# Patient Record
Sex: MALE | Race: ASIAN
[De-identification: names, ages, dates, MRNs, and addresses within clinical notes are randomized per-mention and may not be internally consistent; named-entity substitution may affect disease eponyms.]

---

## 2019-11-14 ENCOUNTER — HOSPITAL ENCOUNTER (OUTPATIENT)
Dept: HOSPITAL 76 - DI | Age: 49
Discharge: HOME | End: 2019-11-14
Attending: SURGERY
Payer: COMMERCIAL

## 2019-11-14 DIAGNOSIS — I25.10: Primary | ICD-10-CM

## 2019-11-14 PROCEDURE — 93005 ELECTROCARDIOGRAM TRACING: CPT

## 2019-11-18 ENCOUNTER — HOSPITAL ENCOUNTER (OUTPATIENT)
Dept: HOSPITAL 76 - SDS | Age: 49
Discharge: HOME | End: 2019-11-18
Attending: SURGERY
Payer: COMMERCIAL

## 2019-11-18 VITALS — SYSTOLIC BLOOD PRESSURE: 144 MMHG | DIASTOLIC BLOOD PRESSURE: 95 MMHG

## 2019-11-18 DIAGNOSIS — K64.2: Primary | ICD-10-CM

## 2019-11-18 DIAGNOSIS — I25.2: ICD-10-CM

## 2019-11-18 DIAGNOSIS — Z79.82: ICD-10-CM

## 2019-11-18 DIAGNOSIS — Z86.010: ICD-10-CM

## 2019-11-18 DIAGNOSIS — Z95.5: ICD-10-CM

## 2019-11-18 DIAGNOSIS — I25.10: ICD-10-CM

## 2019-11-18 PROCEDURE — 46260 REMOVE IN/EX HEM GROUPS 2+: CPT

## 2019-11-18 PROCEDURE — 06BY0ZC EXCISION OF HEMORRHOIDAL PLEXUS, OPEN APPROACH: ICD-10-PCS | Performed by: SURGERY

## 2019-11-18 NOTE — OPERATIVE REPORT
DATE OF SERVICE: 11/18/2019

Physician: Jorje Navarrete MD

 

PREOPERATIVE DIAGNOSIS:  Symptomatic hemorrhoids.

 

POSTOPERATIVE DIAGNOSES:  Prolapsed thrombosed internal/external hemorrhoids.

 

PROCEDURE PERFORMED:  Internal/external hemorrhoidectomy x2.

 

ANESTHESIA:  General endotracheal by Ashley El CRNA, plus local anesthesia.

 

SURGEON:  Jorje Navarrete MD

 

ESTIMATED BLOOD LOSS:  Less than 5 mL

 

COMPLICATIONS:  None.

 

FINDINGS:  Two large prolapsed thrombosed internal/external hemorrhoidal 
complexes were present located in the right anterior and posterior quadrants.  
The anterior quadrant of which was the largest.  No other significant anorectal 
pathology was identified.

 

INDICATIONS:  Patient is a 49-year-old gentleman with longstanding history of 
symptomatic hemorrhoids manifested by prolapse requiring increasing frequent 
manual reduction and increasing discomfort over the past several weeks.  
Evaluation revealed large prolapsing internal/external hemorrhoids, and he was 
advised to undergo a hemorrhoidectomy for definitive surgical treatment.

 

TECHNIQUE:  After informed consent, the patient was taken to the operating room,
where he was placed under general endotracheal anesthesia;he was placed in the 
prone jackknife position with buttocks taped apart.  His perianal region was 
prepared with iodoform solution and draped in the usual sterile fashion.  The 
anal canal was gently dilated to 2 fingers and a univalved anal speculum 
inserted.  Anoscopy was carried out with findings as noted above.  The 2 large 
prolapsed and partially thrombosed internal/external hemorrhoidal complexes were
excised with the LigaSure in an elliptical fashion, being careful to avoid 
injury to the underlying sphincter mechanism and preserving anoderm as much as 
possible.  The excised hemorrhoids were sent for pathologic evaluation.  The 
LigaSure also provided hemostasis.  After hemostasis was ensured, the resulting 
anodermal defects were left open, and the wound was dressed with viscous 
Xylocaine-soaked Gelfoam inserted in the anal canal, followed by dry sterile 
dressing.  Anesthesia was terminated and patient transferred to the recovery 
room in satisfactory condition.  Sponge and needle counts were correct x2.  No 
drains were used.

 

 

cc: Dr. Lupe MD

DD: 11/18/2019 09:34

TD: 11/18/2019 09:37

Job #: [id number]

SHANA

## 2019-12-05 ENCOUNTER — HOSPITAL ENCOUNTER (EMERGENCY)
Dept: HOSPITAL 76 - ED | Age: 49
Discharge: HOME | End: 2019-12-05
Payer: COMMERCIAL

## 2019-12-05 VITALS — SYSTOLIC BLOOD PRESSURE: 155 MMHG | DIASTOLIC BLOOD PRESSURE: 90 MMHG

## 2019-12-05 DIAGNOSIS — R03.0: ICD-10-CM

## 2019-12-05 DIAGNOSIS — Z79.82: ICD-10-CM

## 2019-12-05 DIAGNOSIS — I25.10: ICD-10-CM

## 2019-12-05 DIAGNOSIS — L02.211: Primary | ICD-10-CM

## 2019-12-05 DIAGNOSIS — Z95.5: ICD-10-CM

## 2019-12-05 DIAGNOSIS — L03.311: ICD-10-CM

## 2019-12-05 PROCEDURE — 87181 SC STD AGAR DILUTION PER AGT: CPT

## 2019-12-05 PROCEDURE — 10061 I&D ABSCESS COMP/MULTIPLE: CPT

## 2019-12-05 PROCEDURE — 99283 EMERGENCY DEPT VISIT LOW MDM: CPT

## 2019-12-05 PROCEDURE — 87205 SMEAR GRAM STAIN: CPT

## 2019-12-05 PROCEDURE — 87070 CULTURE OTHR SPECIMN AEROBIC: CPT

## 2019-12-05 NOTE — ED PHYSICIAN DOCUMENTATION
PD HPI WOUND RECHECK





- Stated complaint


Stated Complaint: BITE ON LT HIP/SKIN BOIL





- Chief complaint


Chief Complaint: Wound





- Histroy obtained from


History obtained from: Patient (For about 3 days now has had a painful swelling 

above the left hip.  There was no clear injury.  He is never had anything like 

this before.  No fevers or chills.)





Review of Systems


Constitutional: denies: Fever, Chills


Cardiac: denies: Chest pain / pressure, Palpitations


Respiratory: denies: Dyspnea, Cough





PD PAST MEDICAL HISTORY





- Past Medical History


Past Medical History: Yes


Cardiovascular: Coronary artery disease


Respiratory: None


Endocrine/Autoimmune: None


GI: None


: None


HEENT: None


Psych: None


Musculoskeletal: None


Derm: None





- Past Surgical History


Past Surgical History: Yes


Cardiovascular: Coronary stent





- Present Medications


Home Medications: 


                                Ambulatory Orders











 Medication  Instructions  Recorded  Confirmed


 


Aspirin [Adult Aspirin Regimen] 81 mg PO DAILY 10/15/19 11/18/19


 


Polyethylene Glycol 3350 [Miralax] 17 gm ORAL DAILY PRN #1 bottle 11/18/19 


 


oxyCODONE [Roxicodone] 5 mg PO Q6H PRN #20 tablet 11/18/19 


 


Hydrocodone/Acetaminophen 1 - 2 each PO Q6H PRN #10 tablet 12/05/19 





[Hydrocodon-Acetaminophen 5-325]   


 


Sulfamethoxazole/Trimethoprim 1 each PO BID 7 Days #14 tablet 12/05/19 





[Sulfamethoxazole-Tmp Ds Tablet]   














- Allergies


Allergies/Adverse Reactions: 


                                    Allergies











Allergy/AdvReac Type Severity Reaction Status Date / Time


 


No Known Drug Allergies Allergy   Verified 12/05/19 19:59














- Social History


Does the pt smoke?: No


Smoking Status: Never smoker


Does the pt drink ETOH?: No


Does the pt have substance abuse?: No





- Immunizations


Immunizations are current?: No


Immunizations: TDAP >10years/unknown





- POLST


Patient has POLST: No





PD ED PE NORMAL





- Vitals


Vital signs reviewed: Yes





- General


General: Alert and oriented X 3, No acute distress





- Abdomen


Abdomen: Normal bowel sounds, Soft, Non tender





- Derm


Derm: Other (There is a large deep pointed abscess on the left lateral abdominal

wall with moderate surrounding cellulitis.  )





- Neuro


Neuro: Alert and oriented X 3, Normal speech





Results





- Vitals


Vitals: 


                               Vital Signs - 24 hr











  12/05/19





  19:54


 


Temperature 36.8 C


 


Heart Rate 98


 


Respiratory 16





Rate 


 


Blood Pressure 152/98 H


 


O2 Saturation 97








                                     Oxygen











O2 Source                      Room air

















Procedures





- Abscess I&D (location)


  ** L abd wall


Preparation: Alcohol, Lidocaine 1%, Other (IM dilaudid /ativan for 

anxiolysis/pain)


Incision: Incised with scalpel, Purulent drainage, Loculations broken, Packed 

(with 1/2 inch packing), Culture obtained


Other: Pt tolerated well, Dressing applied, Antibiotic prescribed





Departure





- Departure


Disposition: 01 Home, Self Care


Clinical Impression: 


 Abdominal wall abscess





Condition: Good


Record reviewed to determine appropriate education?: Yes


Instructions:  ED Abscess IandD


Prescriptions: 


Hydrocodone/Acetaminophen [Hydrocodon-Acetaminophen 5-325] 1 - 2 each PO Q6H PRN

#10 tablet


 PRN Reason: pain


Sulfamethoxazole/Trimethoprim [Sulfamethoxazole-Tmp Ds Tablet] 1 each PO BID 7 

Days #14 tablet


Comments: 


We are performing a wound culture, the results should be done in 48-72 hours.  

If antibiotic change is necessary we will call you.  Return if worse in the 

meantime, especially if you develop increased pain, fevers, cannot keep down the

medication.  Otherwise Return here on Saturday for wound check and packing 

removal





Your blood pressure was elevated today on check into the emergency department.  

This does not mean that you have hypertension, it is a common phenomenon to come

to the emergency department and have elevated blood pressure.  I recommend that 

you see your primary care physician within the week to have it rechecked when 

you are feeling better.

## 2019-12-06 ENCOUNTER — HOSPITAL ENCOUNTER (OUTPATIENT)
Dept: HOSPITAL 76 - ED | Age: 49
Setting detail: OBSERVATION
LOS: 1 days | Discharge: HOME | End: 2019-12-07
Attending: INTERNAL MEDICINE | Admitting: INTERNAL MEDICINE
Payer: COMMERCIAL

## 2019-12-06 ENCOUNTER — HOSPITAL ENCOUNTER (OUTPATIENT)
Dept: HOSPITAL 76 - EMS | Age: 49
Discharge: ANOTHER HOSPITAL | End: 2019-12-06
Attending: SURGERY
Payer: COMMERCIAL
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
## 2019-12-06 DIAGNOSIS — Z87.19: ICD-10-CM

## 2019-12-06 DIAGNOSIS — R55: ICD-10-CM

## 2019-12-06 DIAGNOSIS — D64.9: ICD-10-CM

## 2019-12-06 DIAGNOSIS — R19.5: Primary | ICD-10-CM

## 2019-12-06 DIAGNOSIS — I25.10: ICD-10-CM

## 2019-12-06 DIAGNOSIS — K92.2: Primary | ICD-10-CM

## 2019-12-06 DIAGNOSIS — Z86.010: ICD-10-CM
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
## 2019-12-06 DIAGNOSIS — T63.301S: ICD-10-CM

## 2019-12-06 DIAGNOSIS — L02.211: ICD-10-CM
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
## 2019-12-06 DIAGNOSIS — Z95.5: ICD-10-CM

## 2019-12-06 DIAGNOSIS — S01.01XA: ICD-10-CM

## 2019-12-06 DIAGNOSIS — Z79.82: ICD-10-CM

## 2019-12-06 LAB
ALBUMIN DIAFP-MCNC: 3.4 G/DL (ref 3.2–5.5)
ALBUMIN/GLOB SERPL: 1.2 {RATIO} (ref 1–2.2)
ALP SERPL-CCNC: 56 IU/L (ref 42–121)
ALT SERPL W P-5'-P-CCNC: 32 IU/L (ref 10–60)
ANION GAP SERPL CALCULATED.4IONS-SCNC: 10 MMOL/L (ref 6–13)
AST SERPL W P-5'-P-CCNC: 26 IU/L (ref 10–42)
BASOPHILS NFR BLD AUTO: 0.1 10^3/UL (ref 0–0.1)
BASOPHILS NFR BLD AUTO: 0.4 %
BILIRUB BLD-MCNC: 0.4 MG/DL (ref 0.2–1)
BUN SERPL-MCNC: 17 MG/DL (ref 6–20)
CALCIUM UR-MCNC: 8.4 MG/DL (ref 8.5–10.3)
CHLORIDE SERPL-SCNC: 105 MMOL/L (ref 101–111)
CO2 SERPL-SCNC: 25 MMOL/L (ref 21–32)
CREAT SERPLBLD-SCNC: 1.1 MG/DL (ref 0.6–1.2)
EOSINOPHIL # BLD AUTO: 0.1 10^3/UL (ref 0–0.7)
EOSINOPHIL NFR BLD AUTO: 1 %
ERYTHROCYTE [DISTWIDTH] IN BLOOD BY AUTOMATED COUNT: 15 % (ref 12–15)
GFRSERPLBLD MDRD-ARVRAT: 71 ML/MIN/{1.73_M2} (ref 89–?)
GLOBULIN SER-MCNC: 2.8 G/DL (ref 2.1–4.2)
GLUCOSE SERPL-MCNC: 135 MG/DL (ref 70–100)
HGB UR QL STRIP: 9.3 G/DL (ref 14–18)
HGB UR QL STRIP: 9.5 G/DL (ref 14–18)
INR PPP: 1.2 (ref 0.8–1.2)
LIPASE SERPL-CCNC: 24 U/L (ref 22–51)
LYMPHOCYTES # SPEC AUTO: 2.2 10^3/UL (ref 1.5–3.5)
LYMPHOCYTES NFR BLD AUTO: 19.7 %
MCH RBC QN AUTO: 20.3 PG (ref 27–31)
MCHC RBC AUTO-ENTMCNC: 29.5 G/DL (ref 32–36)
MCV RBC AUTO: 68.8 FL (ref 80–94)
MONOCYTES # BLD AUTO: 0.7 10^3/UL (ref 0–1)
MONOCYTES NFR BLD AUTO: 6.4 %
NEUTROPHILS # BLD AUTO: 8.1 10^3/UL (ref 1.5–6.6)
NEUTROPHILS # SNV AUTO: 11.3 X10^3/UL (ref 4.8–10.8)
NEUTROPHILS NFR BLD AUTO: 71.8 %
PDW BLD AUTO: 10.7 FL (ref 7.4–11.4)
PLAT MORPH BLD: (no result)
PLATELET # BLD: 280 10^3/UL (ref 130–450)
PLATELET BLD QL SMEAR: (no result)
PROT SPEC-MCNC: 6.2 G/DL (ref 6.7–8.2)
PROTHROM ACT/NOR PPP: 13.2 SECS (ref 9.9–12.6)
RBC MAR: 4.68 10^6/UL (ref 4.7–6.1)
RBC MORPH BLD: (no result)
SODIUM SERPLBLD-SCNC: 140 MMOL/L (ref 135–145)

## 2019-12-06 PROCEDURE — 86900 BLOOD TYPING SEROLOGIC ABO: CPT

## 2019-12-06 PROCEDURE — 96361 HYDRATE IV INFUSION ADD-ON: CPT

## 2019-12-06 PROCEDURE — 80320 DRUG SCREEN QUANTALCOHOLS: CPT

## 2019-12-06 PROCEDURE — 85027 COMPLETE CBC AUTOMATED: CPT

## 2019-12-06 PROCEDURE — 83690 ASSAY OF LIPASE: CPT

## 2019-12-06 PROCEDURE — 85025 COMPLETE CBC W/AUTO DIFF WBC: CPT

## 2019-12-06 PROCEDURE — 36415 COLL VENOUS BLD VENIPUNCTURE: CPT

## 2019-12-06 PROCEDURE — 85018 HEMOGLOBIN: CPT

## 2019-12-06 PROCEDURE — 93005 ELECTROCARDIOGRAM TRACING: CPT

## 2019-12-06 PROCEDURE — 86901 BLOOD TYPING SEROLOGIC RH(D): CPT

## 2019-12-06 PROCEDURE — 99285 EMERGENCY DEPT VISIT HI MDM: CPT

## 2019-12-06 PROCEDURE — 80053 COMPREHEN METABOLIC PANEL: CPT

## 2019-12-06 PROCEDURE — 80048 BASIC METABOLIC PNL TOTAL CA: CPT

## 2019-12-06 PROCEDURE — 85610 PROTHROMBIN TIME: CPT

## 2019-12-06 PROCEDURE — 85014 HEMATOCRIT: CPT

## 2019-12-06 PROCEDURE — 96374 THER/PROPH/DIAG INJ IV PUSH: CPT

## 2019-12-06 PROCEDURE — 84484 ASSAY OF TROPONIN QUANT: CPT

## 2019-12-06 PROCEDURE — 86850 RBC ANTIBODY SCREEN: CPT

## 2019-12-06 NOTE — ED PHYSICIAN DOCUMENTATION
PD HPI GI BLEED





- Stated complaint


Stated Complaint: GI BLEED





- Chief complaint


Chief Complaint: Neuro





- History obtained from


History obtained from: Patient





- History of Present Illness


Timing - onset: Enter  time (17:00), Today


Timing - details: Abrupt onset, Intermittant


Pain level max: 0


Pain level now: 0


Associated symptoms: BRBPR


Contributing factors: Aspirin use.  No: Alcohol use, Anticoagulated


Improved by: Other (nothing)


Worsened by: Other (no apparent exacerbating factors)


Similar symptoms before: Has not had sx before


Recently seen: Emergency Dept (T+R yesterday from this ED for left hip abscess 

(I+D, rx antibiotics))





- Additional information


Additional information: 





4 episodes of red/dark red blood per rectum since 5 PM today. After the fourth 

episode, he then had syncope with prodrome of lightheadedness, generalized 

weakness, pale. He fell from standing position, struck his head on floor and 

sustained forehead abrasion. He had 2 more episode of syncope when he tried to 

stand back up after recovering. Denies any pain, including HA and including 

abdominal pain.





Review of Systems


Constitutional: denies: Fever, Chills, Fatigue, Sweats


Eyes: reports: Reviewed and negative


Ears: reports: Reviewed and negative


Nose: reports: Reviewed and negative


Throat: reports: Reviewed and negative


Cardiac: reports: Reviewed and negative


Respiratory: reports: Reviewed and negative


GI: reports: Diarrhea, Bloody / black stool.  denies: Abdominal Pain, Abdominal 

Swelling, Nausea, Vomiting, Constipation, Hematemesis


: denies: Dysuria, Frequency


Skin: reports: Reviewed and negative


Musculoskeletal: reports: Reviewed and negative


Neurologic: reports: Generalized weakness (immediately preceding syncopal 

episodes), Syncope, Head injury, LOC.  denies: Focal weakness, Numbness, 

Headache





PD PAST MEDICAL HISTORY





- Past Medical History


Cardiovascular: Coronary artery disease


Respiratory: None


Endocrine/Autoimmune: None


GI: None


: None


HEENT: None


Psych: None


Musculoskeletal: None


Derm: None





- Past Surgical History


Past Surgical History: Yes


Cardiovascular: Coronary stent





- Present Medications


Home Medications: 


                                Ambulatory Orders











 Medication  Instructions  Recorded  Confirmed


 


Aspirin [Adult Aspirin Regimen] 81 mg PO DAILY 10/15/19 11/18/19


 


Polyethylene Glycol 3350 [Miralax] 17 gm ORAL DAILY PRN #1 bottle 11/18/19 


 


oxyCODONE [Roxicodone] 5 mg PO Q6H PRN #20 tablet 11/18/19 


 


Hydrocodone/Acetaminophen 1 - 2 each PO Q6H PRN #10 tablet 12/05/19 





[Hydrocodon-Acetaminophen 5-325]   


 


Sulfamethoxazole/Trimethoprim 1 each PO BID 7 Days #14 tablet 12/05/19 





[Sulfamethoxazole-Tmp Ds Tablet]   














- Allergies


Allergies/Adverse Reactions: 


                                    Allergies











Allergy/AdvReac Type Severity Reaction Status Date / Time


 


No Known Drug Allergies Allergy   Verified 12/06/19 20:15














- Social History


Does the pt smoke?: No


Smoking Status: Never smoker


Does the pt drink ETOH?: No


Does the pt have substance abuse?: No





- Immunizations


Immunizations are current?: No


Immunizations: TDAP >10years/unknown





- POLST


Patient has POLST: No





PD ED PE NORMAL





- Vitals


Vital signs reviewed: Yes





- General


General: Alert and oriented X 3, No acute distress, Well developed/nourished





- HEENT


HEENT: PERRL, EOMI, Moist mucous membranes, Other (superficial abrasion left 

forehead without tenderness or bony step off)





- Neck


Neck: No bony TTP





- Cardiac


Cardiac: RRR, No murmur, No gallop, No rub





- Respiratory


Respiratory: No respiratory distress, Clear bilaterally





- Abdomen


Abdomen: Normal bowel sounds, Soft, Non tender, Non distended





- Back


Back: No spinal TTP





- Derm


Derm: Normal color, Warm and dry





- Extremities


Extremities: No edema





- Neuro


Neuro: Alert and oriented X 3, CNs 2-12 intact, No motor deficit, No sensory d

eficit, Normal speech


Eye Opening: Spontaneous


Motor: Obeys Commands


Verbal: Oriented


GCS Score: 15





- Psych


Psych: Normal mood, Normal affect





- Free text exam


Free text exam: 





left hip I+D location has packing in place, small amount of dry blood on 

bandage.





Results





- Vitals


Vitals: 


                               Vital Signs - 24 hr











  12/06/19 12/06/19 12/06/19





  19:51 20:14 20:59


 


Temperature 36.5 C  


 


Heart Rate 68 76 74


 


Respiratory 22 18 13





Rate   


 


Blood Pressure 115/67 117/75 109/71


 


O2 Saturation 100 100 98














  12/06/19 12/06/19 12/06/19





  21:10 21:50 22:25


 


Temperature   


 


Heart Rate 75 67 64


 


Respiratory 19 19 25 H





Rate   


 


Blood Pressure 102/66 100/67 102/57 L


 


O2 Saturation 95 97 97














  12/06/19





  23:02


 


Temperature 


 


Heart Rate 68


 


Respiratory 22





Rate 


 


Blood Pressure 97/81 H


 


O2 Saturation 97








                                     Oxygen











O2 Source                      Room air

















- EKG (time done)


  ** No standard instances


Rate: Rate (enter#) (72)


Rhythm: NSR


Axis: LAD


Intervals: Normal DC


QRS: Normal


Ischemia: T wave inversion (III, with flat T aVF)





- Labs


Labs: 


                                Laboratory Tests











  12/06/19 12/06/19 12/06/19





  20:04 20:04 20:04


 


WBC   11.3 H 


 


RBC   4.68 L 


 


Hgb   9.5 L 


 


Hct   32.2 L 


 


MCV   68.8 L 


 


MCH   20.3 L 


 


MCHC   29.5 L 


 


RDW   15.0 


 


Plt Count   280 


 


MPV   10.7 


 


Neut # (Auto)   8.1 H 


 


Lymph # (Auto)   2.2 


 


Mono # (Auto)   0.7 


 


Eos # (Auto)   0.1 


 


Baso # (Auto)   0.1 


 


Absolute Nucleated RBC   0.00 


 


Nucleated RBC %   0.0 


 


Manual Slide Review   Indicated 


 


Platelet Estimate   NORMAL (130-450,000) 


 


Platelet Morphology   NORMAL APPEARANCE 


 


RBC Morph Micro Appear   1+ HYPOCHROMASIA 


 


PT    13.2 H


 


INR    1.2


 


Sodium   


 


Potassium   


 


Chloride   


 


Carbon Dioxide   


 


Anion Gap   


 


BUN   


 


Creatinine   


 


Estimated GFR (MDRD)   


 


Glucose   


 


Calcium   


 


Total Bilirubin   


 


AST   


 


ALT   


 


Alkaline Phosphatase   


 


Troponin I High Sens   


 


Total Protein   


 


Albumin   


 


Globulin   


 


Albumin/Globulin Ratio   


 


Lipase   


 


Ethyl Alcohol   


 


Blood Type  B POSITIVE  


 


Blood Type Recheck   


 


Antibody Screen  NEGATIVE  














  12/06/19 12/06/19 12/06/19





  20:04 20:04 22:40


 


WBC   


 


RBC   


 


Hgb   


 


Hct   


 


MCV   


 


MCH   


 


MCHC   


 


RDW   


 


Plt Count   


 


MPV   


 


Neut # (Auto)   


 


Lymph # (Auto)   


 


Mono # (Auto)   


 


Eos # (Auto)   


 


Baso # (Auto)   


 


Absolute Nucleated RBC   


 


Nucleated RBC %   


 


Manual Slide Review   


 


Platelet Estimate   


 


Platelet Morphology   


 


RBC Morph Micro Appear   


 


PT   


 


INR   


 


Sodium  140  


 


Potassium  3.4 L  


 


Chloride  105  


 


Carbon Dioxide  25  


 


Anion Gap  10.0  


 


BUN  17  


 


Creatinine  1.1  


 


Estimated GFR (MDRD)  71 L  


 


Glucose  135 H  


 


Calcium  8.4 L  


 


Total Bilirubin  0.4  


 


AST  26  


 


ALT  32  


 


Alkaline Phosphatase  56  


 


Troponin I High Sens   3.5 


 


Total Protein  6.2 L  


 


Albumin  3.4  


 


Globulin  2.8  


 


Albumin/Globulin Ratio  1.2  


 


Lipase  24  


 


Ethyl Alcohol  < 5.0  


 


Blood Type   


 


Blood Type Recheck    B POSITIVE


 


Antibody Screen   














  12/06/19





  22:40


 


WBC 


 


RBC 


 


Hgb  9.3 L


 


Hct  31.2 L


 


MCV 


 


MCH 


 


MCHC 


 


RDW 


 


Plt Count 


 


MPV 


 


Neut # (Auto) 


 


Lymph # (Auto) 


 


Mono # (Auto) 


 


Eos # (Auto) 


 


Baso # (Auto) 


 


Absolute Nucleated RBC 


 


Nucleated RBC % 


 


Manual Slide Review 


 


Platelet Estimate 


 


Platelet Morphology 


 


RBC Morph Micro Appear 


 


PT 


 


INR 


 


Sodium 


 


Potassium 


 


Chloride 


 


Carbon Dioxide 


 


Anion Gap 


 


BUN 


 


Creatinine 


 


Estimated GFR (MDRD) 


 


Glucose 


 


Calcium 


 


Total Bilirubin 


 


AST 


 


ALT 


 


Alkaline Phosphatase 


 


Troponin I High Sens 


 


Total Protein 


 


Albumin 


 


Globulin 


 


Albumin/Globulin Ratio 


 


Lipase 


 


Ethyl Alcohol 


 


Blood Type 


 


Blood Type Recheck 


 


Antibody Screen 














PD MEDICAL DECISION MAKING





- ED course


Complexity details: reviewed old records, reviewed results, re-evaluated 

patient, considered differential, d/w patient


ED course: 





After initial tests resulted but while awaiting 2nd h/h, I was in room talking 

with patient and he had abdominal cramping with urge to defecate; he then had 

bradycardia to mid/upper 30s lasting approximately 20-30 seconds (while still 

lying on stretcher), and then resumed NSR 60s bpm without any intervention. 





Departure





- Departure


Disposition: ED Place in Observation


Clinical Impression: 


 Syncope, Acute lower GI bleeding





Condition: Stable


Discharge Date/Time: 12/07/19 00:44

## 2019-12-07 VITALS — SYSTOLIC BLOOD PRESSURE: 132 MMHG | DIASTOLIC BLOOD PRESSURE: 80 MMHG

## 2019-12-07 LAB
ANION GAP SERPL CALCULATED.4IONS-SCNC: 6 MMOL/L (ref 6–13)
BUN SERPL-MCNC: 14 MG/DL (ref 6–20)
CALCIUM UR-MCNC: 8.6 MG/DL (ref 8.5–10.3)
CHLORIDE SERPL-SCNC: 111 MMOL/L (ref 101–111)
CO2 SERPL-SCNC: 26 MMOL/L (ref 21–32)
CREAT SERPLBLD-SCNC: 0.8 MG/DL (ref 0.6–1.2)
ERYTHROCYTE [DISTWIDTH] IN BLOOD BY AUTOMATED COUNT: 14.8 % (ref 12–15)
ERYTHROCYTE [DISTWIDTH] IN BLOOD BY AUTOMATED COUNT: 14.8 % (ref 12–15)
ERYTHROCYTE [DISTWIDTH] IN BLOOD BY AUTOMATED COUNT: 14.9 % (ref 12–15)
GFRSERPLBLD MDRD-ARVRAT: 102 ML/MIN/{1.73_M2} (ref 89–?)
GLUCOSE SERPL-MCNC: 106 MG/DL (ref 70–100)
HGB UR QL STRIP: 8.2 G/DL (ref 14–18)
HGB UR QL STRIP: 8.8 G/DL (ref 14–18)
HGB UR QL STRIP: 9 G/DL (ref 14–18)
MCH RBC QN AUTO: 20.1 PG (ref 27–31)
MCH RBC QN AUTO: 20.2 PG (ref 27–31)
MCH RBC QN AUTO: 20.9 PG (ref 27–31)
MCHC RBC AUTO-ENTMCNC: 29.4 G/DL (ref 32–36)
MCHC RBC AUTO-ENTMCNC: 29.5 G/DL (ref 32–36)
MCHC RBC AUTO-ENTMCNC: 29.9 G/DL (ref 32–36)
MCV RBC AUTO: 68.4 FL (ref 80–94)
MCV RBC AUTO: 68.6 FL (ref 80–94)
MCV RBC AUTO: 69.7 FL (ref 80–94)
NEUTROPHILS # SNV AUTO: 8.3 X10^3/UL (ref 4.8–10.8)
NEUTROPHILS # SNV AUTO: 8.9 X10^3/UL (ref 4.8–10.8)
NEUTROPHILS # SNV AUTO: 9.6 X10^3/UL (ref 4.8–10.8)
PDW BLD AUTO: 10.3 FL (ref 7.4–11.4)
PDW BLD AUTO: 10.9 FL (ref 7.4–11.4)
PDW BLD AUTO: 9.9 FL (ref 7.4–11.4)
PLATELET # BLD: 251 10^3/UL (ref 130–450)
PLATELET # BLD: 277 10^3/UL (ref 130–450)
PLATELET # BLD: 288 10^3/UL (ref 130–450)
RBC MAR: 4.07 10^6/UL (ref 4.7–6.1)
RBC MAR: 4.22 10^6/UL (ref 4.7–6.1)
RBC MAR: 4.46 10^6/UL (ref 4.7–6.1)
SODIUM SERPLBLD-SCNC: 143 MMOL/L (ref 135–145)

## 2019-12-07 RX ADMIN — HYDROCODONE BITARTRATE AND ACETAMINOPHEN PRN TAB: 7.5; 325 TABLET ORAL at 08:00

## 2019-12-07 RX ADMIN — SODIUM CHLORIDE SCH MLS/HR: 9 INJECTION, SOLUTION INTRAVENOUS at 01:31

## 2019-12-07 RX ADMIN — SODIUM CHLORIDE, PRESERVATIVE FREE SCH ML: 5 INJECTION INTRAVENOUS at 01:31

## 2019-12-07 RX ADMIN — SULFAMETHOXAZOLE AND TRIMETHOPRIM SCH TAB: 800; 160 TABLET ORAL at 09:07

## 2019-12-07 RX ADMIN — SODIUM CHLORIDE, PRESERVATIVE FREE SCH: 5 INJECTION INTRAVENOUS at 16:51

## 2019-12-07 RX ADMIN — HYDROCODONE BITARTRATE AND ACETAMINOPHEN PRN TAB: 7.5; 325 TABLET ORAL at 13:41

## 2019-12-07 RX ADMIN — SODIUM CHLORIDE SCH MLS/HR: 9 INJECTION, SOLUTION INTRAVENOUS at 08:53

## 2019-12-07 RX ADMIN — SODIUM CHLORIDE, PRESERVATIVE FREE SCH: 5 INJECTION INTRAVENOUS at 09:07

## 2019-12-07 RX ADMIN — SODIUM CHLORIDE SCH MLS/HR: 9 INJECTION, SOLUTION INTRAVENOUS at 16:49

## 2019-12-07 RX ADMIN — SULFAMETHOXAZOLE AND TRIMETHOPRIM SCH TAB: 800; 160 TABLET ORAL at 23:03

## 2019-12-07 NOTE — DISCHARGE SUMMARY
Discharge Summary


Admit Date: 19


Discharge Date: 19


Discharging Provider: Shaggy Contreras


Primary Care Provider: Rashad Andrade


Code Status: Attempt Resuscitation


Condition at Discharge: Stable


Discharge Disposition: 01 Home, Self Care


Discharge Facility Name: [facility]





- DIAGNOSES


Admission Diagnoses: 


1. Acute Lower GI Bleeding


2. Syncope


3. History of coronary artery disease


4. Abdominal wall abscess


5. Scalp laceration





Discharge Diagnoses with Status of Each Condition: 


1. Acute Lower GI Bleeding: Resolved


2. Syncope: Resolved


3. History of coronary artery disease: Chronic


4. Abdominal wall abscess: Ongoing. Being treated with bactrim DS


5. Scalp laceration: Stable








- HPI


History of Present Illness: 


Patient is a 50 y/o male who presented to the ED with complain of 4 episodes of 

bright red blood per rectum which happened around 5pm on 19. He had a 

syncope episode after the fourth episode. He fell from a standing position and 

hit his head on the toilet sink. There is a laceration on his forehead as a 

result. He was pale and weak at the time. It is reported that he had 2 more 

episodes of syncope when he tried to stand after recovery from the first 

syncope. He denied any pain. However he does experience intermittent abdominal 

cramping with bowel movement. He had a bowel movement in the ED which was 

described as brownish and loose. However, his wife reports there was a small 

amount of blood in it. CBC done showed a hemoglobin of 9.5 with a repeat of 9.3.

He is being admitted for closer monitoring and to trend his H&H


At bedside he is very AOX3 and does not appear to be in any significant 

discomfort. He denies chest pain, dyspnea, abd pain, n/v/d, fever or chills. 


He has history of internal/external hemorrhoid and underwent surgery in 2019. He also had a colonoscopy in 2019 3 polyps seen.


He was seen in the ED yesterday and had an abscess on his left oblique area I&D.

It was packed and he was placed on a 7 day course of bactrim with plan to follow

up with his PCP in 1-2 days.








- HOSPITAL COURSE


Hospital Course: 


Patient's hospital stay was fairly unremarkable. He was put on IV hydration and 

a clear liquid . Serial H&H was done q8hr.


Hemoglobin was 9.5, 9.3, 9.0, 8.2 then 8.8. He was able to ambulate around the 

floor with no difficulty.  His wound packing and dressing was changed and his 

bactrim DS was continued. He has a prescription of bactrim DS at home and is 

expected to take it bid until completion.


He has a scheduled appointment with Dr Rashad Andrade in 4 days. If he should 

have any new bleeding or increased fever, pain or redness at the site of his 

abdominal wall incision and drainage before he sees his PCP, he is to return to 

the ED.








- ALLERGIES


Allergies/Adverse Reactions: 


                                    Allergies











Allergy/AdvReac Type Severity Reaction Status Date / Time


 


No Known Drug Allergies Allergy   Verified 19 20:15














- MEDICATIONS


Home Medications: 


                                Ambulatory Orders











 Medication  Instructions  Recorded  Confirmed


 


Aspirin [Adult Aspirin Regimen] 81 mg PO DAILY 10/15/19 12/07/19


 


Sulfamethox/Trimeth 800/160 1 each PO BID #14 tablet 19 





[Bactrim Ds 800/160]   














- PHYSICAL EXAM AT DISCHARGE


General Appearance: positive: No acute distress


Eyes Bilateral: positive: Normal inspection, PERRL, EOMI


ENT: positive: ENT inspection nml, No signs of dehydration


Neck: positive: Nml inspection, No JVD, Trachea midline


Respiratory: positive: Chest non-tender, No respiratory distress, Breath sounds 

nml.  negative: Wheezes, Rales, Rhonchi


Cardiovascular: positive: Regular rate & rhythm


Abdomen: positive: Non-tender, Nml bowel sounds, No distention


Back: positive: Nml inspection


Skin: positive: Color nml, No rash, Warm


Extremities: positive: Non-tender, Full ROM


Neurologic/Psychiatric: positive: Oriented x3, CN's nml (2-12), Motor nml, 

Sensation nml





- LABS


Result Diagrams: 


                                 19 19:40





                                 19 04:55





- FOLLOW UP


Follow Up: 


With Dr Andrade in 4 days








- TIME SPENT


Time Spent in Discharge (Minutes): 31

## 2019-12-07 NOTE — DISCHARGE PLAN
Discharge Plan


Problem Reviewed?: Yes


Disposition: 01 Home, Self Care


Condition: Stable


Diet: Regular


Activity Restrictions: No Restrictions


Weight Bearing: Full Weight


Health Concerns: 


1. Lower GI Bleed


2. Syncope


3. Hx of Coronary Artery Disease


4. Abdominal wall abscess


Plan of Treatment: 


1. Lower GI Bleed


Resolved.


Hemoglobin stabilized. If it recurs, please return to the ED





2. Syncope.


Resolved.


Likely related to bleeding and/or vasovagal episode





3. Hx of Coronary Artery Disease


Continue taking baby aspirin





4. Abdominal Wall Abscess.


Packing and dressing changed. Continue taking prescription of bactrim you have 

at home until completed


Follow up with Dr Andrade as scheduled in 4 days


If pain or redness worsens before your appointment day, come back to the ED








Care Goals: 


1. Lower GI Bleed


Resolved.


Hemoglobin stabilized. If it recurs, please return to the ED





2. Syncope.


Resolved.


Likely related to bleeding and/or vasovagal episode





3. Hx of Coronary Artery Disease


Continue taking baby aspirin





4. Abdominal Wall Abscess.


Packing and dressing changed. Continue taking prescription of bactrim you have 

at home until completed


Follow up with Dr Andrade as scheduled in 4 days


If pain or redness worsens before your appointment day, come back to the ED


Assessment: 


Patient expressed understanding


No Smoking: If you smoke, Please STOP!  Call [phone] for help.


Follow-up with: 


Rashad Andrade MD [Primary Care Provider] -

## 2019-12-07 NOTE — CONSULTATION NOTE
Referring Provider


Name of Referring Provider:: Dr SHAH





Chief Complaint





- Chief Complaint


Chief Complaint: Spider bite w/cellulitis and abscess previously drained in ED a

couple of d





History of Present Illness





- History of Present Illness


HPI Comment/Other: 





Patient is a 48 y/o male who presented to the ED with complain of 4 episodes of 

bright red blood per rectum which happened around 5pm on 12/6/19. He had a syn

cope episode after the fourth episode. He fell from a standing position and hit 

his head on the toilet sink. There is a laceration on his forehead as a result. 

He was pale and weak at the time. It is reported that he had 2 more episodes of 

syncope when he tried to stand after recovery from the first syncope. He denied 

any pain. However he does experience intermittent abdominal cramping with bowel 

movement. He had a bowel movement in the ED which was described as brownish and 

loose. However, his wife reports there was a small amount of blood in it. CBC 

done showed a hemoglobin of 9.5 with a repeat of 9.3. He is being admitted for 

closer monitoring and to trend his H&H


At bedside he is very AOX3 and does not appear to be in any significant 

discomfort. He denies chest pain, dyspnea, abd pain, n/v/d, fever or chills. 


He has history of internal/external hemorrhoid and underwent surgery in November 2019. He also had a colonoscopy in October of 2019 3 polyps seen.


He was seen in the ED yesterday and had an abscess on his left oblique area I&D.

It was packed and he was placed on a 7 day course of bactrim with plan to follow

up with his PCP in 1-2 days.





History





- Past Medical History


Cardiovascular: reports: Coronary artery disease


Respiratory: reports: None


Endocrine/Autoimmune: reports: None


GI: reports: None


: reports: None


HEENT: reports: None


Psych: reports: None


Musculoskeletal: reports: None


Derm: reports: None


MRSA Hx?: No





- Past Surgical History


Cardiovascular: reports: Coronary stent


Other past surgical history: colonoscopy in October 2019





- Family & Social History


Family History Comment/Other: He denies any significant family history


Living arrangement: At home


Living Situation: With spouse/s.o.


Social History Notes: He denies alcohol, tobacco or illicit drug use





- POLST


Patient has POLST: No


POLST Status: Full Code





Meds/Allgy





- Home Medications


Home Medications: 


                                Ambulatory Orders











 Medication  Instructions  Recorded  Confirmed


 


Aspirin [Adult Aspirin Regimen] 81 mg PO DAILY 10/15/19 12/07/19


 


Sulfamethoxazole/Trimethoprim 1 each PO [id number] 12/07/19 12/07/19





[Sulfamethoxazole-Tmp Ds Tablet]   














- Allergies


Allergies/Adverse Reactions: 


                                    Allergies











Allergy/AdvReac Type Severity Reaction Status Date / Time


 


No Known Drug Allergies Allergy   Verified 12/06/19 20:15














Exam





- Vital Signs


Reviewed Vital Signs: Yes


Vital Signs: 





                                Vital Signs x48h











  Temp Pulse Resp BP Pulse Ox


 


 12/07/19 07:35  36.8 C  75  16  120/64  98


 


 12/07/19 05:01  36.7 C  71  16  101/54 L  98














- Physical Exam


General Appearance: positive: No acute distress


Eyes Bilateral: positive: Normal inspection


Skin: positive: Other (Abscess L eft lat thorax post-op I&D in ED showing some 

drainage and induration.)





Conclusion and Plan





- Lab Results





Laboratory Results





12/07/19 04:55: Sodium 143, Potassium 4.0, Chloride 111, Carbon Dioxide 26, 

Anion Gap 6.0, BUN 14, Creatinine 0.8, Estimated GFR (MDRD) 102, Glucose 106 H, 

Calcium 8.6


12/07/19 04:55: WBC 8.9, RBC 4.46 L, Hgb 9.0 L, Hct 30.5 L, MCV 68.4 L, MCH 20.2

L, MCHC 29.5 L, RDW 14.8, Plt Count 277, MPV 10.9


12/06/19 22:40: Hgb 9.3 L, Hct 31.2 L


12/06/19 22:40: Blood Type Recheck B POSITIVE


12/06/19 20:04: Troponin I High Sens 3.5


12/06/19 20:04: Sodium 140, Potassium 3.4 L, Chloride 105, Carbon Dioxide 25, 

Anion Gap 10.0, BUN 17, Creatinine 1.1, Estimated GFR (MDRD) 71 L, Glucose 135 

H, Calcium 8.4 L, Total Bilirubin 0.4, AST 26, ALT 32, Alkaline Phosphatase 56, 

Total Protein 6.2 L, Albumin 3.4, Globulin 2.8, Albumin/Globulin Ratio 1.2, 

Lipase 24, Ethyl Alcohol < 5.0


12/06/19 20:04: PT 13.2 H, INR 1.2


12/06/19 20:04: WBC 11.3 H, RBC 4.68 L, Hgb 9.5 L, Hct 32.2 L, MCV 68.8 L, MCH 

20.3 L, MCHC 29.5 L, RDW 15.0, Plt Count 280, MPV 10.7, Neut # (Auto) 8.1 H, 

Lymph # (Auto) 2.2, Mono # (Auto) 0.7, Eos # (Auto) 0.1, Baso # (Auto) 0.1, 

Absolute Nucleated RBC 0.00, Nucleated RBC % 0.0, Manual Slide Review Indicated,

Platelet Estimate NORMAL (130-450,000), Platelet Morphology NORMAL APPEARANCE, 

RBC Morph Micro Appear 1+ HYPOCHROMASIA


12/06/19 20:04: Blood Type B POSITIVE, Antibody Screen NEGATIVE











- Diagnosis


Diagnosis: Abscess left lat thorax s/p I&D





- Plan


Plan: 





IV abx as per Hospitalist

## 2019-12-07 NOTE — HISTORY & PHYSICAL EXAMINATION
Chief Complaint





- Chief Complaint


Chief Complaint: bright red blood per rectum





History of Present Illness





- Admitted From


Admitted From:: [facility]





- History Obtained From


Records Reviewed: yes


History obtained from: patient





- History of Present Illness


HPI Comment/Other: 


Patient is a 50 y/o male who presented to the ED with complain of 4 episodes of 

bright red blood per rectum which happened around 5pm on 12/6/19. He had a 

syncope episode after the fourth episode. He fell from a standing position and 

hit his head on the toilet sink. There is a laceration on his forehead as a 

result. He was pale and weak at the time. It is reported that he had 2 more 

episodes of syncope when he tried to stand after recovery from the first 

syncope. He denied any pain. However he does experience intermittent abdominal 

cramping with bowel movement. He had a bowel movement in the ED which was 

described as brownish and loose. However, his wife reports there was a small 

amount of blood in it. CBC done showed a hemoglobin of 9.5 with a repeat of 9.3.

He is being admitted for closer monitoring and to trend his H&H


At bedside he is very AOX3 and does not appear to be in any significant 

discomfort. He denies chest pain, dyspnea, abd pain, n/v/d, fever or chills. 


He has history of internal/external hemorrhoid and underwent surgery in November 2019. He also had a colonoscopy in October of 2019 3 polyps seen.


He was seen in the ED yesterday and had an abscess on his left oblique area I&D.

It was packed and he was placed on a 7 day course of bactrim with plan to follow

up with his PCP in 1-2 days.








History





- Past Medical History


Cardiovascular: reports: Coronary artery disease


Respiratory: reports: None


Endocrine/Autoimmune: reports: None


GI: reports: None


: reports: None


HEENT: reports: None


Psych: reports: None


Musculoskeletal: reports: None


Derm: reports: None


MRSA Hx?: No





- Past Surgical History


Cardiovascular: reports: Coronary stent


Other past surgical history: colonoscopy in October 2019





- Family & Social History


Family History Comment/Other: He denies any significant family history


Living arrangement: At home


Living Situation: With spouse/s.o.


Social History Notes: He denies alcohol, tobacco or illicit drug use





- POLST


Patient has POLST: No


POLST Status: Full Code





Meds/Allgy





- Home Medications


Home Medications: 


                                Ambulatory Orders











 Medication  Instructions  Recorded  Confirmed


 


Aspirin [Adult Aspirin Regimen] 81 mg PO DAILY 10/15/19 11/18/19


 


Polyethylene Glycol 3350 [Miralax] 17 gm ORAL DAILY PRN #1 bottle 11/18/19 


 


oxyCODONE [Roxicodone] 5 mg PO Q6H PRN #20 tablet 11/18/19 


 


Hydrocodone/Acetaminophen 1 - 2 each PO Q6H PRN #10 tablet 12/05/19 





[Hydrocodon-Acetaminophen 5-325]   


 


Sulfamethoxazole/Trimethoprim 1 each PO BID 7 Days #14 tablet 12/05/19 





[Sulfamethoxazole-Tmp Ds Tablet]   














- Allergies


Allergies/Adverse Reactions: 


                                    Allergies











Allergy/AdvReac Type Severity Reaction Status Date / Time


 


No Known Drug Allergies Allergy   Verified 12/06/19 20:15














Review of Systems





- Constitutional


Constitutional: denies: Fatigue, Fever, Chills, Diaphoresis





- Eyes


Eyes: denies: Pain, Blurred vision, Dipolpia





- Ears, Nose & Throat


Ears, Nose & Throat: denies: Tinnitus





- Cardiovascular


Cariovascular: reports: Syncope.  denies: Irregular heart rate, Palpitations, 

Chest pain, Edema, Exertional dyspnea





- Respiratory


Respiratory: denies: Sputum production, Wheezing, Snoring, SOB at rest, SOB with

exertion





- Gastrointestinal


Gastrointestinal: reports: Rectal bleeding.  denies: Abdominal pain, Abdominal 

distention, Constipation, Black stools, Bloody stools, Nausea, Vomiting, Coffee 

grounds emesis, Reflux/heartburn





- Genitourinary


Genitourinary: denies: Dysuria, Frequency, Urgency, Hematuria, Incontinence, 

Flank pain, Nocturia





- Musculoskeletal


Musculoskeletal: denies: Muscle pain, Back pain





- Integumentary


Integumentary: denies: Rash, Pruritis, Lesions, Dryness





- Neurological


Neurological: denies: General weakness, Focal weakness, Headache





- Psychiatric


Psychiatric: denies: Depression, Anxiety





- Endocrine


Endocrine: denies: Polyuria, Polydypsia





- Hematologic/Lymphatic


Hematologic/Lymphatic: reports: Anemia.  denies: Bruising, Petechiae


Prior Level of Functionality: 


Patient is independent of activities of daily living








Exam





- Vital Signs


Vital Signs: 





                                Vital Signs x48h











  Temp Pulse Resp BP Pulse Ox


 


 12/06/19 23:57   84  23  96/60  97


 


 12/06/19 23:02   68  22  97/81 H  97


 


 12/06/19 22:25   64  25 H  102/57 L  97


 


 12/06/19 21:50   67  19  100/67  97


 


 12/06/19 21:10   75  19  102/66  95


 


 12/06/19 20:59   74  13  109/71  98


 


 12/06/19 20:14   76  18  117/75  100


 


 12/06/19 19:51  36.5 C  68  22  115/67  100














- Physical Exam


General Appearance: positive: Alert, Mild distress


Eyes Bilateral: positive: Normal inspection, PERRL, EOMI


ENT: positive: ENT inspection nml, No signs of dehydration


Neck: positive: Nml inspection, No JVD, Trachea midline


Respiratory: positive: Chest non-tender, No respiratory distress, Breath sounds 

nml.  negative: Wheezes, Rales, Rhonchi


Cardiovascular: positive: Regular rate & rhythm


Abdomen: positive: Non-tender, No organomegaly, Nml bowel sounds, No distention.

 negative: Guarding, Rebound


Rectal: positive: Hemorrhoid


Back: positive: Nml inspection


Skin: positive: Color nml, No rash, Warm


Extremities: positive: Non-tender, Full ROM, Nml appearance, No pedal edema


Neurologic/Psychiatric: positive: Oriented x3, CN's nml (2-12), Motor nml, 

Sensation nml, Mood/affect nml





Conclusion/Plan





- Problem List


(1) Acute lower GI bleeding


Conclusion/Plan: 


Likely 2/2 hemorrhoid bleed


Patient has recent history of surgery done for internal hemorrhoids


Hemoglobin 9.3. Will monitor q8hrs. If stable an no further bleed, will consider

discharge.


However if it persists, will consult General Surgery.


Patient had colonoscopy in October of 2019 done by Dr Jorje Navarrete. 


With 3 sessile polyps noted


IV hydration with normal saline.








(2) Syncope


Conclusion/Plan: 


Likely vaso-vagal. In light of GI bleed with bowel movement


IV hydration. Monitor


Qualifiers: 


   Syncope type: unspecified   Qualified Code(s): R55 - Syncope and collapse   





(3) History of coronary artery disease


Conclusion/Plan: 


s/p PCI with 1 stent place.


On aspirin. Will hold for now








(4) Abdominal wall abscess


Conclusion/Plan: 


Left obliques


This was incised and drained in the ED on 12/05/19.


It was packed and patient was prescribe Bactrim BID X 7 days.


He has taken 1 dose so far. Will continue.


Patient is to follow up with his PCP in 1-2 days








(5) Scalp laceration


Conclusion/Plan: 


Superficial. No suture warranted.


Qualifiers: 


   Encounter type: initial encounter   Qualified Code(s): S01.01XA - Laceration 

without foreign body of scalp, initial encounter   





- Lab Results


Fish Bones: 


                                 12/06/19 22:40





                                 12/06/19 20:04





Core Measures





- Anticipated LOS


I expect patient to be DC'd or transferred within 96 hours.: Yes





- DVT/VTE - Prophylaxis


VTE/DVT Device ordered at admit?: Yes

## 2019-12-12 ENCOUNTER — HOSPITAL ENCOUNTER (OUTPATIENT)
Dept: HOSPITAL 76 - LAB.WCP | Age: 49
Discharge: HOME | End: 2019-12-12
Attending: FAMILY MEDICINE
Payer: COMMERCIAL

## 2019-12-12 DIAGNOSIS — D50.9: Primary | ICD-10-CM

## 2019-12-12 DIAGNOSIS — I25.10: ICD-10-CM

## 2019-12-12 DIAGNOSIS — K92.2: ICD-10-CM

## 2019-12-12 LAB
BASOPHILS NFR BLD AUTO: 0.1 10^3/UL (ref 0–0.1)
BASOPHILS NFR BLD AUTO: 0.8 %
CHOLEST SERPL-MCNC: 231 MG/DL
EOSINOPHIL # BLD AUTO: 0.1 10^3/UL (ref 0–0.7)
EOSINOPHIL NFR BLD AUTO: 1.2 %
ERYTHROCYTE [DISTWIDTH] IN BLOOD BY AUTOMATED COUNT: 15.5 % (ref 12–15)
FERRITIN SERPL-MCNC: 80.5 NG/ML (ref 23.9–336.2)
HDLC SERPL-MCNC: 32 MG/DL
HDLC SERPL: 7.2 {RATIO} (ref ?–5)
HGB UR QL STRIP: 10.4 G/DL (ref 14–18)
IRON SATN MFR SERPL: 17 % (ref 20–50)
IRON SERPL-MCNC: 55 UG/DL (ref 45–182)
LDLC SERPL CALC-MCNC: 169 MG/DL
LDLC/HDLC SERPL: 5.3 {RATIO} (ref ?–3.6)
LYMPHOCYTES # SPEC AUTO: 2.7 10^3/UL (ref 1.5–3.5)
LYMPHOCYTES NFR BLD AUTO: 37.4 %
MCH RBC QN AUTO: 20.4 PG (ref 27–31)
MCHC RBC AUTO-ENTMCNC: 28.9 G/DL (ref 32–36)
MCV RBC AUTO: 70.7 FL (ref 80–94)
MONOCYTES # BLD AUTO: 0.4 10^3/UL (ref 0–1)
MONOCYTES NFR BLD AUTO: 5.4 %
NEUTROPHILS # BLD AUTO: 3.9 10^3/UL (ref 1.5–6.6)
NEUTROPHILS # SNV AUTO: 7.3 X10^3/UL (ref 4.8–10.8)
NEUTROPHILS NFR BLD AUTO: 53.4 %
PDW BLD AUTO: 10.2 FL (ref 7.4–11.4)
PLATELET # BLD: 485 10^3/UL (ref 130–450)
RBC MAR: 5.09 10^6/UL (ref 4.7–6.1)
TIBC SERPL-MCNC: 326 UG/DL (ref 250–450)
TRANSFERRIN SERPL-MCNC: 233 MG/DL (ref 180–329)
VLDLC SERPL-SCNC: 30 MG/DL

## 2019-12-12 PROCEDURE — 82728 ASSAY OF FERRITIN: CPT

## 2019-12-12 PROCEDURE — 36415 COLL VENOUS BLD VENIPUNCTURE: CPT

## 2019-12-12 PROCEDURE — 83540 ASSAY OF IRON: CPT

## 2019-12-12 PROCEDURE — 85025 COMPLETE CBC W/AUTO DIFF WBC: CPT

## 2019-12-12 PROCEDURE — 80061 LIPID PANEL: CPT

## 2019-12-12 PROCEDURE — 81599 UNLISTED MAAA: CPT

## 2019-12-12 PROCEDURE — 84466 ASSAY OF TRANSFERRIN: CPT

## 2019-12-12 PROCEDURE — 83721 ASSAY OF BLOOD LIPOPROTEIN: CPT

## 2019-12-12 PROCEDURE — 84443 ASSAY THYROID STIM HORMONE: CPT

## 2020-07-09 ENCOUNTER — HOSPITAL ENCOUNTER (OUTPATIENT)
Dept: HOSPITAL 76 - LAB.WCP | Age: 50
Discharge: HOME | End: 2020-07-09
Attending: FAMILY MEDICINE
Payer: COMMERCIAL

## 2020-07-09 DIAGNOSIS — I25.10: Primary | ICD-10-CM

## 2020-07-09 LAB
ALBUMIN DIAFP-MCNC: 4.8 G/DL (ref 3.2–5.5)
ALBUMIN/GLOB SERPL: 1.8 {RATIO} (ref 1–2.2)
ALP SERPL-CCNC: 54 IU/L (ref 42–121)
ALT SERPL W P-5'-P-CCNC: 77 IU/L (ref 10–60)
ANION GAP SERPL CALCULATED.4IONS-SCNC: 7 MMOL/L (ref 6–13)
AST SERPL W P-5'-P-CCNC: 39 IU/L (ref 10–42)
BASOPHILS NFR BLD AUTO: 0.1 10^3/UL (ref 0–0.1)
BASOPHILS NFR BLD AUTO: 1.1 %
BILIRUB BLD-MCNC: 0.6 MG/DL (ref 0.2–1)
BUN SERPL-MCNC: 20 MG/DL (ref 6–20)
CALCIUM UR-MCNC: 9.1 MG/DL (ref 8.5–10.3)
CHLORIDE SERPL-SCNC: 104 MMOL/L (ref 101–111)
CHOLEST SERPL-MCNC: 220 MG/DL
CO2 SERPL-SCNC: 26 MMOL/L (ref 21–32)
CREAT SERPLBLD-SCNC: 0.9 MG/DL (ref 0.6–1.2)
EOSINOPHIL # BLD AUTO: 0.2 10^3/UL (ref 0–0.7)
EOSINOPHIL NFR BLD AUTO: 2.7 %
ERYTHROCYTE [DISTWIDTH] IN BLOOD BY AUTOMATED COUNT: 15.8 % (ref 12–15)
GLOBULIN SER-MCNC: 2.6 G/DL (ref 2.1–4.2)
GLUCOSE SERPL-MCNC: 114 MG/DL (ref 70–100)
HDLC SERPL-MCNC: 23 MG/DL
HDLC SERPL: 9.6 {RATIO} (ref ?–5)
HGB UR QL STRIP: 13.8 G/DL (ref 14–18)
LDLC SERPL-MCNC: 49 MG/DL
LDLC/HDLC SERPL: 2.1 {RATIO} (ref ?–3.6)
LYMPHOCYTES # SPEC AUTO: 2.4 10^3/UL (ref 1.5–3.5)
LYMPHOCYTES NFR BLD AUTO: 41.7 %
MCH RBC QN AUTO: 21.2 PG (ref 27–31)
MCHC RBC AUTO-ENTMCNC: 29.7 G/DL (ref 32–36)
MCV RBC AUTO: 71.2 FL (ref 80–94)
MONOCYTES # BLD AUTO: 0.3 10^3/UL (ref 0–1)
MONOCYTES NFR BLD AUTO: 6 %
NEUTROPHILS # BLD AUTO: 2.7 10^3/UL (ref 1.5–6.6)
NEUTROPHILS # SNV AUTO: 5.6 X10^3/UL (ref 4.8–10.8)
NEUTROPHILS NFR BLD AUTO: 48 %
PDW BLD AUTO: 11.4 FL (ref 7.4–11.4)
PLATELET # BLD: 227 10^3/UL (ref 130–450)
PROT SPEC-MCNC: 7.4 G/DL (ref 6.7–8.2)
RBC MAR: 6.52 10^6/UL (ref 4.7–6.1)
SODIUM SERPLBLD-SCNC: 137 MMOL/L (ref 135–145)

## 2020-07-09 PROCEDURE — 85025 COMPLETE CBC W/AUTO DIFF WBC: CPT

## 2020-07-09 PROCEDURE — 83721 ASSAY OF BLOOD LIPOPROTEIN: CPT

## 2020-07-09 PROCEDURE — 80061 LIPID PANEL: CPT

## 2020-07-09 PROCEDURE — 84443 ASSAY THYROID STIM HORMONE: CPT

## 2020-07-09 PROCEDURE — 80053 COMPREHEN METABOLIC PANEL: CPT

## 2020-07-09 PROCEDURE — 36415 COLL VENOUS BLD VENIPUNCTURE: CPT

## 2020-08-12 ENCOUNTER — HOSPITAL ENCOUNTER (OUTPATIENT)
Dept: HOSPITAL 76 - LAB.WCP | Age: 50
Discharge: HOME | End: 2020-08-12
Attending: FAMILY MEDICINE
Payer: COMMERCIAL

## 2020-08-12 DIAGNOSIS — R79.89: ICD-10-CM

## 2020-08-12 DIAGNOSIS — D50.9: Primary | ICD-10-CM

## 2020-08-12 DIAGNOSIS — R73.01: ICD-10-CM

## 2020-08-12 LAB
ALBUMIN DIAFP-MCNC: 4.9 G/DL (ref 3.2–5.5)
ALBUMIN/GLOB SERPL: 1.9 {RATIO} (ref 1–2.2)
ALP SERPL-CCNC: 59 IU/L (ref 42–121)
ALT SERPL W P-5'-P-CCNC: 56 IU/L (ref 10–60)
ANION GAP SERPL CALCULATED.4IONS-SCNC: 8 MMOL/L (ref 6–13)
AST SERPL W P-5'-P-CCNC: 33 IU/L (ref 10–42)
BASOPHILS NFR BLD AUTO: 0.1 10^3/UL (ref 0–0.1)
BASOPHILS NFR BLD AUTO: 1.3 %
BILIRUB BLD-MCNC: 0.7 MG/DL (ref 0.2–1)
BUN SERPL-MCNC: 13 MG/DL (ref 6–20)
CALCIUM UR-MCNC: 9.7 MG/DL (ref 8.5–10.3)
CHLORIDE SERPL-SCNC: 104 MMOL/L (ref 101–111)
CHOLEST SERPL-MCNC: 136 MG/DL
CO2 SERPL-SCNC: 28 MMOL/L (ref 21–32)
CREAT SERPLBLD-SCNC: 0.9 MG/DL (ref 0.6–1.2)
EOSINOPHIL # BLD AUTO: 0.1 10^3/UL (ref 0–0.7)
EOSINOPHIL NFR BLD AUTO: 1.7 %
ERYTHROCYTE [DISTWIDTH] IN BLOOD BY AUTOMATED COUNT: 16.4 % (ref 12–15)
EST. AVERAGE GLUCOSE BLD GHB EST-MCNC: 128 MG/DL (ref 70–100)
GLOBULIN SER-MCNC: 2.6 G/DL (ref 2.1–4.2)
GLUCOSE SERPL-MCNC: 100 MG/DL (ref 70–100)
HB2 TOTAL: 15 G/DL
HBA1C BLD-MCNC: 0.65 G/DL
HDLC SERPL-MCNC: 37 MG/DL
HDLC SERPL: 3.7 {RATIO} (ref ?–5)
HEMOGLOBIN A1C %: 6.1 % (ref 4.6–6.2)
HGB UR QL STRIP: 14.4 G/DL (ref 14–18)
IRON SATN MFR SERPL: 48 % (ref 20–50)
IRON SERPL-MCNC: 167 UG/DL (ref 45–182)
LDLC SERPL CALC-MCNC: 78 MG/DL
LDLC/HDLC SERPL: 2.1 {RATIO} (ref ?–3.6)
LYMPHOCYTES # SPEC AUTO: 2.7 10^3/UL (ref 1.5–3.5)
LYMPHOCYTES NFR BLD AUTO: 45.7 %
MCH RBC QN AUTO: 20.7 PG (ref 27–31)
MCHC RBC AUTO-ENTMCNC: 29.8 G/DL (ref 32–36)
MCV RBC AUTO: 69.7 FL (ref 80–94)
MONOCYTES # BLD AUTO: 0.3 10^3/UL (ref 0–1)
MONOCYTES NFR BLD AUTO: 5.2 %
NEUTROPHILS # BLD AUTO: 2.8 10^3/UL (ref 1.5–6.6)
NEUTROPHILS # SNV AUTO: 6 X10^3/UL (ref 4.8–10.8)
NEUTROPHILS NFR BLD AUTO: 46.1 %
PDW BLD AUTO: 11.7 FL (ref 7.4–11.4)
PLAT MORPH BLD: (no result)
PLATELET # BLD: 218 10^3/UL (ref 130–450)
PLATELET BLD QL SMEAR: (no result)
PROT SPEC-MCNC: 7.5 G/DL (ref 6.7–8.2)
RBC MAR: 6.94 10^6/UL (ref 4.7–6.1)
RBC MORPH BLD: (no result)
SODIUM SERPLBLD-SCNC: 140 MMOL/L (ref 135–145)
TIBC SERPL-MCNC: 347 UG/DL (ref 250–450)
TRANSFERRIN SERPL-MCNC: 248 MG/DL (ref 180–329)
VLDLC SERPL-SCNC: 21 MG/DL

## 2020-08-12 PROCEDURE — 80053 COMPREHEN METABOLIC PANEL: CPT

## 2020-08-12 PROCEDURE — 83540 ASSAY OF IRON: CPT

## 2020-08-12 PROCEDURE — 84466 ASSAY OF TRANSFERRIN: CPT

## 2020-08-12 PROCEDURE — 80074 ACUTE HEPATITIS PANEL: CPT

## 2020-08-12 PROCEDURE — 36415 COLL VENOUS BLD VENIPUNCTURE: CPT

## 2020-08-12 PROCEDURE — 85025 COMPLETE CBC W/AUTO DIFF WBC: CPT

## 2020-08-12 PROCEDURE — 80061 LIPID PANEL: CPT

## 2020-08-12 PROCEDURE — 83036 HEMOGLOBIN GLYCOSYLATED A1C: CPT

## 2020-08-12 PROCEDURE — 83721 ASSAY OF BLOOD LIPOPROTEIN: CPT

## 2020-08-12 PROCEDURE — 82728 ASSAY OF FERRITIN: CPT

## 2020-08-13 LAB
HEP C AB SIGNAL CUT-OFF: 0 (ref ?–1)
HEPATITIS A IGM: (no result)
HEPATITIS B SURFACE ANTIGEN: (no result)
HEPATITIS C ANTIBODY: (no result)

## 2021-01-19 ENCOUNTER — HOSPITAL ENCOUNTER (OUTPATIENT)
Dept: HOSPITAL 76 - COV | Age: 51
Discharge: HOME | End: 2021-01-19
Attending: FAMILY MEDICINE
Payer: COMMERCIAL

## 2021-01-19 DIAGNOSIS — U07.1: Primary | ICD-10-CM

## 2021-02-18 ENCOUNTER — HOSPITAL ENCOUNTER (OUTPATIENT)
Dept: HOSPITAL 76 - LAB.WCP | Age: 51
Discharge: HOME | End: 2021-02-18
Attending: INTERNAL MEDICINE
Payer: COMMERCIAL

## 2021-02-18 DIAGNOSIS — E78.5: ICD-10-CM

## 2021-02-18 DIAGNOSIS — I25.10: ICD-10-CM

## 2021-02-18 DIAGNOSIS — R79.89: ICD-10-CM

## 2021-02-18 DIAGNOSIS — R74.8: ICD-10-CM

## 2021-02-18 DIAGNOSIS — D50.9: ICD-10-CM

## 2021-02-18 DIAGNOSIS — R73.01: Primary | ICD-10-CM

## 2021-02-18 LAB
ALBUMIN DIAFP-MCNC: 4.7 G/DL (ref 3.2–5.5)
ALBUMIN/GLOB SERPL: 1.7 {RATIO} (ref 1–2.2)
ALP SERPL-CCNC: 51 IU/L (ref 42–121)
ALT SERPL W P-5'-P-CCNC: 65 IU/L (ref 10–60)
ANION GAP SERPL CALCULATED.4IONS-SCNC: 9 MMOL/L (ref 6–13)
AST SERPL W P-5'-P-CCNC: 36 IU/L (ref 10–42)
BASOPHILS NFR BLD AUTO: 0.1 10^3/UL (ref 0–0.1)
BASOPHILS NFR BLD AUTO: 0.9 %
BILIRUB BLD-MCNC: 0.7 MG/DL (ref 0.2–1)
BUN SERPL-MCNC: 21 MG/DL (ref 6–20)
CALCIUM UR-MCNC: 9.3 MG/DL (ref 8.5–10.3)
CHLORIDE SERPL-SCNC: 105 MMOL/L (ref 101–111)
CHOLEST SERPL-MCNC: 149 MG/DL
CO2 SERPL-SCNC: 26 MMOL/L (ref 21–32)
CREAT SERPLBLD-SCNC: 0.9 MG/DL (ref 0.6–1.2)
CREAT UR-SCNC: 173 MG/DL
EOSINOPHIL # BLD AUTO: 0.1 10^3/UL (ref 0–0.7)
EOSINOPHIL NFR BLD AUTO: 2 %
ERYTHROCYTE [DISTWIDTH] IN BLOOD BY AUTOMATED COUNT: 15.6 % (ref 12–15)
GLOBULIN SER-MCNC: 2.7 G/DL (ref 2.1–4.2)
GLUCOSE SERPL-MCNC: 104 MG/DL (ref 70–100)
HBA1C MFR BLD HPLC: 6.2 % (ref 4.27–6.07)
HDLC SERPL-MCNC: 39 MG/DL
HDLC SERPL: 3.8 {RATIO} (ref ?–5)
HGB UR QL STRIP: 13.4 G/DL (ref 14–18)
LDLC SERPL CALC-MCNC: 81 MG/DL
LDLC/HDLC SERPL: 2.1 {RATIO} (ref ?–3.6)
LYMPHOCYTES # SPEC AUTO: 3 10^3/UL (ref 1.5–3.5)
LYMPHOCYTES NFR BLD AUTO: 43.2 %
MCH RBC QN AUTO: 20.6 PG (ref 27–31)
MCHC RBC AUTO-ENTMCNC: 29.3 G/DL (ref 32–36)
MCV RBC AUTO: 70.1 FL (ref 80–94)
MICROALBUMIN UR-MCNC: 6 MG/DL (ref 0–300)
MICROALBUMIN/CREAT RATIO PNL UR: 34.7 UG/MG (ref ?–30)
MONOCYTES # BLD AUTO: 0.4 10^3/UL (ref 0–1)
MONOCYTES NFR BLD AUTO: 5.2 %
NEUTROPHILS # BLD AUTO: 3.4 10^3/UL (ref 1.5–6.6)
NEUTROPHILS # SNV AUTO: 7 X10^3/UL (ref 4.8–10.8)
NEUTROPHILS NFR BLD AUTO: 48.6 %
PDW BLD AUTO: 11.6 FL (ref 7.4–11.4)
PLATELET # BLD: 201 10^3/UL (ref 130–450)
PROT SPEC-MCNC: 7.4 G/DL (ref 6.7–8.2)
RBC MAR: 6.52 10^6/UL (ref 4.7–6.1)
VLDLC SERPL-SCNC: 29 MG/DL

## 2021-02-18 PROCEDURE — 80061 LIPID PANEL: CPT

## 2021-02-18 PROCEDURE — 82570 ASSAY OF URINE CREATININE: CPT

## 2021-02-18 PROCEDURE — 83721 ASSAY OF BLOOD LIPOPROTEIN: CPT

## 2021-02-18 PROCEDURE — 82043 UR ALBUMIN QUANTITATIVE: CPT

## 2021-02-18 PROCEDURE — 85025 COMPLETE CBC W/AUTO DIFF WBC: CPT

## 2021-02-18 PROCEDURE — 83036 HEMOGLOBIN GLYCOSYLATED A1C: CPT

## 2021-02-18 PROCEDURE — 36415 COLL VENOUS BLD VENIPUNCTURE: CPT

## 2021-02-18 PROCEDURE — 84443 ASSAY THYROID STIM HORMONE: CPT

## 2021-02-18 PROCEDURE — 84153 ASSAY OF PSA TOTAL: CPT

## 2021-02-18 PROCEDURE — 80053 COMPREHEN METABOLIC PANEL: CPT

## 2021-09-14 NOTE — ANESTHESIA
Pre-Anesthesia VS, & Labs





- Diagnosis





Hemorrhoids





- Procedure





Hemorrhoidectomy


Vital Signs: 








                                Last Vital Signs











Temp  37.2 C   11/18/19 07:49


 


Pulse  71   11/18/19 07:49


 


Resp  18   11/18/19 07:49


 


BP  137/92 H  11/18/19 07:49


 


Pulse Ox  98   11/18/19 07:49














                                        





Height                           5 ft 1 in


Body Mass Index                  26.0











- NPO


>8 hours


Last Fluid Intake: H20@0645





Home Medications and Allergies





                                        





Aspirin [Adult Aspirin Regimen] 81 mg PO DAILY 10/15/19 








Allergies/Adverse Reactions: 


                                    Allergies











Allergy/AdvReac Type Severity Reaction Status Date / Time


 


No Known Drug Allergies Allergy   Verified 11/13/19 11:07














Anes History & Medical History





- Anesthetic History


Anesthesia Complications: reports: No previous complications


Family history of Anesthesia Complications: Denies


Family history of Malignant Hyperthermia: Denies





- Medical History


Cardiovascular: reports: Coronary artery disease


Pulmonary: reports: None


Gastrointestinal: reports: None


Urinary: reports: None


Musculoskeletal: reports: None


Endocrine/Autoimmune: reports: None


Skin: reports: None


Smoking Status: Never smoker





- Surgical History


General: Colonoscopy


Cardiothoracic: Coronary stent





Exam


General: Alert, Oriented x3, Cooperative


Dental: WNL


Mouth Opening: 3 Fingerbreadth


Neck Mobility: Normal


Mallampati classification: II


Thyromental Distance: 4-6 cm


Respiratory: Lungs clear, Normal breath sounds, No respiratory distress


Cardiovascular: Regular rate


Neurological: Normal speech


Mental/Cognitive Status: Alert/Oriented X3, Normal for patient


Cognitive Status: Within normal limits





Plan


Anesthesia Type: General


Consent for Procedure(s) Verified and Reviewed: Yes


Code Status: Attempt Resuscitation


ASA classification: 3-Severe systemic disease


Is this case an emergency?: No no